# Patient Record
(demographics unavailable — no encounter records)

---

## 2024-10-25 NOTE — PHYSICAL EXAM
[Alert] : alert [Normal Voice/Communication] : normal voice/communication [Healthy Appearing] : healthy appearing [No Acute Distress] : no acute distress [Well Developed] : well developed [Well Nourished] : well nourished [Sclera] : the sclera and conjunctiva were normal [Hearing Threshold Finger Rub Not [place]] : hearing was normal [Normal Lips/Gums] : the lips and gums were normal [Oropharynx] : the oropharynx was normal [Normal Appearance] : the appearance of the neck was normal [No Neck Mass] : no neck mass was observed [No Respiratory Distress] : no respiratory distress [No Acc Muscle Use] : no accessory muscle use [Respiration, Rhythm And Depth] : normal respiratory rhythm and effort [Auscultation Breath Sounds / Voice Sounds] : lungs were clear to auscultation bilaterally [Heart Rate And Rhythm] : heart rate was normal and rhythm regular [Normal S1, S2] : normal S1 and S2 [Murmurs] : no murmurs [Bowel Sounds] : normal bowel sounds [Abdomen Tenderness] : non-tender [No Masses] : no abdominal mass palpated [Abdomen Soft] : soft [] : no hepatosplenomegaly [Oriented To Time, Place, And Person] : oriented to person, place, and time [de-identified] : Deferred until colonoscopy

## 2025-01-31 NOTE — PHYSICAL EXAM
[Appropriately responsive] : appropriately responsive [Alert] : alert [No Acute Distress] : no acute distress [No Lymphadenopathy] : no lymphadenopathy [Regular Rate Rhythm] : regular rate rhythm [No Murmurs] : no murmurs [Clear to Auscultation B/L] : clear to auscultation bilaterally [Soft] : soft [Non-tender] : non-tender [Non-distended] : non-distended [No HSM] : No HSM [No Lesions] : no lesions [No Mass] : no mass [Oriented x3] : oriented x3 [Examination Of The Breasts] : a normal appearance [No Masses] : no breast masses were palpable [Labia Majora] : normal [Labia Minora] : normal [Scant] : There was scant vaginal bleeding [Normal] : normal [Anteversion] : anteverted [Uterine Adnexae] : non-palpable [No Tenderness] : no tenderness [Nl Sphincter Tone] : normal sphincter tone [FreeTextEntry2] : duncan lamas ma [Tenderness] : nontender [Enlarged ___ wks] : not enlarged [FreeTextEntry4] : Dark brown blood in vault

## 2025-01-31 NOTE — DISCUSSION/SUMMARY
[FreeTextEntry1] : Assessment is normal GYN exam.  Patient will continue on Prempro as prescribed.  Will let me know if she has any irregular bleeding.  Urine dipstick in the office came back positive for trace blood we will treat with Keflex.  Patient will call if not feeling better after treatment.  Up-to-date with mammography and colonoscopy.  Follow-up 1 year.

## 2025-01-31 NOTE — HISTORY OF PRESENT ILLNESS
[FreeTextEntry1] : 63-year-old -0-1-1 is here for yearly exam.  Is taking Prempro without difficulty and wants to continue.  No vaginal bleeding or discharge normal urination and bowel function.  No changes in weight diet or appetite.  Recently had a mammography and colonoscopy.  Past medical, surgical and family history reviewed and updated. [Mammogramdate] : 12/2024 [BreastSonogramDate] : 12/2024 [PapSmeardate] : 9/12/2023 [ColonoscopyDate] : 12/2024 [HPVDate] : 9/12/2023 [LMPDate] : 2015 [TextBox_6] : 2015 [TextBox_9] : 11